# Patient Record
Sex: FEMALE | Race: WHITE | NOT HISPANIC OR LATINO | URBAN - METROPOLITAN AREA
[De-identification: names, ages, dates, MRNs, and addresses within clinical notes are randomized per-mention and may not be internally consistent; named-entity substitution may affect disease eponyms.]

---

## 2022-09-18 ENCOUNTER — HOSPITAL ENCOUNTER (EMERGENCY)
Facility: HOSPITAL | Age: 57
Discharge: HOME | End: 2022-09-18
Attending: GENERAL PRACTICE
Payer: MEDICARE

## 2022-09-18 VITALS
HEART RATE: 44 BPM | TEMPERATURE: 98 F | BODY MASS INDEX: 21.03 KG/M2 | DIASTOLIC BLOOD PRESSURE: 73 MMHG | HEIGHT: 67 IN | OXYGEN SATURATION: 98 % | RESPIRATION RATE: 15 BRPM | SYSTOLIC BLOOD PRESSURE: 124 MMHG | WEIGHT: 134 LBS

## 2022-09-18 DIAGNOSIS — S46.911A SHOULDER STRAIN, RIGHT, INITIAL ENCOUNTER: ICD-10-CM

## 2022-09-18 DIAGNOSIS — R00.1 SYMPTOMATIC SINUS BRADYCARDIA: ICD-10-CM

## 2022-09-18 DIAGNOSIS — R55 SYNCOPE AND COLLAPSE: ICD-10-CM

## 2022-09-18 DIAGNOSIS — R55 SYNCOPE, UNSPECIFIED SYNCOPE TYPE: ICD-10-CM

## 2022-09-18 DIAGNOSIS — T42.4X5A: Primary | ICD-10-CM

## 2022-09-18 LAB
ALBUMIN SERPL-MCNC: 4.2 GM/DL (ref 3.5–5)
ALBUMIN/GLOB SERPL: 1.4 RATIO (ref 1.1–2)
ALP SERPL-CCNC: 51 UNIT/L (ref 40–150)
ALT SERPL-CCNC: 22 UNIT/L (ref 0–55)
AMPHET UR QL SCN: NEGATIVE
AST SERPL-CCNC: 24 UNIT/L (ref 5–34)
B-HCG SERPL QL: NEGATIVE
BARBITURATE SCN PRESENT UR: NEGATIVE
BASOPHILS # BLD AUTO: 0.05 X10(3)/MCL (ref 0–0.2)
BASOPHILS NFR BLD AUTO: 0.9 %
BENZODIAZ UR QL SCN: POSITIVE
BILIRUBIN DIRECT+TOT PNL SERPL-MCNC: 0.5 MG/DL
BNP BLD-MCNC: 120.1 PG/ML
BUN SERPL-MCNC: 9 MG/DL (ref 9.8–20.1)
CALCIUM SERPL-MCNC: 9.9 MG/DL (ref 8.4–10.2)
CANNABINOIDS UR QL SCN: NEGATIVE
CHLORIDE SERPL-SCNC: 109 MMOL/L (ref 98–107)
CK MB SERPL-MCNC: 2.2 NG/ML
CK SERPL-CCNC: 98 U/L (ref 29–168)
CO2 SERPL-SCNC: 27 MMOL/L (ref 22–29)
COCAINE UR QL SCN: NEGATIVE
CREAT SERPL-MCNC: 0.65 MG/DL (ref 0.55–1.02)
EOSINOPHIL # BLD AUTO: 0.18 X10(3)/MCL (ref 0–0.9)
EOSINOPHIL NFR BLD AUTO: 3.3 %
ERYTHROCYTE [DISTWIDTH] IN BLOOD BY AUTOMATED COUNT: 13.7 % (ref 11.5–17)
FENTANYL UR QL SCN: NEGATIVE
GFR SERPLBLD CREATININE-BSD FMLA CKD-EPI: >60 MLS/MIN/1.73/M2
GLOBULIN SER-MCNC: 2.9 GM/DL (ref 2.4–3.5)
GLUCOSE SERPL-MCNC: 87 MG/DL (ref 74–100)
HCT VFR BLD AUTO: 41.4 % (ref 37–47)
HGB BLD-MCNC: 13.5 GM/DL (ref 12–16)
IMM GRANULOCYTES # BLD AUTO: 0.01 X10(3)/MCL (ref 0–0.04)
IMM GRANULOCYTES NFR BLD AUTO: 0.2 %
LYMPHOCYTES # BLD AUTO: 1.71 X10(3)/MCL (ref 0.6–4.6)
LYMPHOCYTES NFR BLD AUTO: 31.6 %
MCH RBC QN AUTO: 31.1 PG (ref 27–31)
MCHC RBC AUTO-ENTMCNC: 32.6 MG/DL (ref 33–36)
MCV RBC AUTO: 95.4 FL (ref 80–94)
MDMA UR QL SCN: NEGATIVE
MONOCYTES # BLD AUTO: 0.46 X10(3)/MCL (ref 0.1–1.3)
MONOCYTES NFR BLD AUTO: 8.5 %
NEUTROPHILS # BLD AUTO: 3 X10(3)/MCL (ref 2.1–9.2)
NEUTROPHILS NFR BLD AUTO: 55.5 %
NRBC BLD AUTO-RTO: 0 %
OPIATES UR QL SCN: NEGATIVE
PCP UR QL: NEGATIVE
PH UR: 7 [PH] (ref 3–11)
PLATELET # BLD AUTO: 222 X10(3)/MCL (ref 130–400)
PMV BLD AUTO: 9.8 FL (ref 7.4–10.4)
POTASSIUM SERPL-SCNC: 4.1 MMOL/L (ref 3.5–5.1)
PROT SERPL-MCNC: 7.1 GM/DL (ref 6.4–8.3)
RBC # BLD AUTO: 4.34 X10(6)/MCL (ref 4.2–5.4)
SODIUM SERPL-SCNC: 144 MMOL/L (ref 136–145)
SPECIFIC GRAVITY, URINE AUTO (.000) (OHS): 1.01 (ref 1–1.03)
TROPONIN I SERPL-MCNC: <0.01 NG/ML (ref 0–0.04)
TSH SERPL-ACNC: 1.1 UIU/ML (ref 0.35–4.94)
WBC # SPEC AUTO: 5.4 X10(3)/MCL (ref 4.5–11.5)

## 2022-09-18 PROCEDURE — 80053 COMPREHEN METABOLIC PANEL: CPT | Performed by: GENERAL PRACTICE

## 2022-09-18 PROCEDURE — 96372 THER/PROPH/DIAG INJ SC/IM: CPT | Mod: 59 | Performed by: GENERAL PRACTICE

## 2022-09-18 PROCEDURE — 81025 URINE PREGNANCY TEST: CPT | Performed by: GENERAL PRACTICE

## 2022-09-18 PROCEDURE — 99285 EMERGENCY DEPT VISIT HI MDM: CPT | Mod: 25

## 2022-09-18 PROCEDURE — 84484 ASSAY OF TROPONIN QUANT: CPT | Performed by: GENERAL PRACTICE

## 2022-09-18 PROCEDURE — 63600175 PHARM REV CODE 636 W HCPCS: Performed by: GENERAL PRACTICE

## 2022-09-18 PROCEDURE — 82553 CREATINE MB FRACTION: CPT | Performed by: GENERAL PRACTICE

## 2022-09-18 PROCEDURE — 36415 COLL VENOUS BLD VENIPUNCTURE: CPT | Performed by: GENERAL PRACTICE

## 2022-09-18 PROCEDURE — 85025 COMPLETE CBC W/AUTO DIFF WBC: CPT | Performed by: GENERAL PRACTICE

## 2022-09-18 PROCEDURE — 83880 ASSAY OF NATRIURETIC PEPTIDE: CPT | Performed by: GENERAL PRACTICE

## 2022-09-18 PROCEDURE — 80307 DRUG TEST PRSMV CHEM ANLYZR: CPT | Performed by: GENERAL PRACTICE

## 2022-09-18 PROCEDURE — 96375 TX/PRO/DX INJ NEW DRUG ADDON: CPT

## 2022-09-18 PROCEDURE — 82550 ASSAY OF CK (CPK): CPT | Performed by: GENERAL PRACTICE

## 2022-09-18 PROCEDURE — 84443 ASSAY THYROID STIM HORMONE: CPT | Performed by: GENERAL PRACTICE

## 2022-09-18 PROCEDURE — 25000003 PHARM REV CODE 250: Performed by: GENERAL PRACTICE

## 2022-09-18 PROCEDURE — 96374 THER/PROPH/DIAG INJ IV PUSH: CPT

## 2022-09-18 RX ORDER — KETOROLAC TROMETHAMINE 30 MG/ML
15 INJECTION, SOLUTION INTRAMUSCULAR; INTRAVENOUS
Status: COMPLETED | OUTPATIENT
Start: 2022-09-18 | End: 2022-09-18

## 2022-09-18 RX ORDER — DIAZEPAM 5 MG/1
5 TABLET ORAL
COMMUNITY
Start: 2022-08-24

## 2022-09-18 RX ORDER — FLUMAZENIL 0.1 MG/ML
0.5 INJECTION INTRAVENOUS ONCE
Status: COMPLETED | OUTPATIENT
Start: 2022-09-18 | End: 2022-09-18

## 2022-09-18 RX ORDER — SCOLOPAMINE TRANSDERMAL SYSTEM 1 MG/1
1 PATCH, EXTENDED RELEASE TRANSDERMAL
COMMUNITY
Start: 2022-04-05 | End: 2022-09-29

## 2022-09-18 RX ORDER — HYDROXYZINE HYDROCHLORIDE 25 MG/ML
50 INJECTION, SOLUTION INTRAMUSCULAR
Status: COMPLETED | OUTPATIENT
Start: 2022-09-18 | End: 2022-09-18

## 2022-09-18 RX ADMIN — FLUMAZENIL 0.5 MG: 0.1 INJECTION INTRAVENOUS at 12:09

## 2022-09-18 RX ADMIN — HYDROXYZINE HYDROCHLORIDE 50 MG: 25 INJECTION, SOLUTION INTRAMUSCULAR at 12:09

## 2022-09-18 RX ADMIN — KETOROLAC TROMETHAMINE 15 MG: 30 INJECTION, SOLUTION INTRAMUSCULAR; INTRAVENOUS at 12:09

## 2022-09-18 NOTE — ED PROVIDER NOTES
Encounter Date: 9/18/2022       History     Chief Complaint   Patient presents with    Loss of Consciousness     History of meniere disease, got dizzy and passed out at home. Took her valium at 4:30am. C/o pain to right shoulder, neck and middle back and head.     History of meniere disease, got dizzy and passed out at home. Took her valium at 4:30am. C/o pain to right shoulder, neck and middle back and head.    The history is provided by the patient.   Loss of Consciousness  This is a new problem. The current episode started less than 1 hour ago. The problem occurs rarely. The problem has not changed since onset.Associated symptoms include headaches. The symptoms are aggravated by bending and exertion. Nothing relieves the symptoms. She has tried nothing for the symptoms.   Review of patient's allergies indicates:   Allergen Reactions    Codeine Anaphylaxis    Opioids - morphine analogues     Tetanus vaccines and toxoid Other (See Comments)     Patient unable to tell us reaction she had    Wheat containing prod      Past Medical History:   Diagnosis Date    Meniere's disease, unspecified ear      History reviewed. No pertinent surgical history.  History reviewed. No pertinent family history.  Social History     Tobacco Use    Smoking status: Never    Smokeless tobacco: Never   Substance Use Topics    Alcohol use: Never    Drug use: Never     Review of Systems   Constitutional: Negative.    HENT: Negative.     Eyes: Negative.    Respiratory: Negative.     Cardiovascular:  Positive for syncope.   Gastrointestinal: Negative.    Endocrine: Negative.    Genitourinary: Negative.    Musculoskeletal:  Positive for gait problem, joint swelling, neck pain and neck stiffness.   Skin: Negative.    Allergic/Immunologic: Negative.    Neurological:  Positive for dizziness, syncope, weakness, light-headedness and headaches.   Hematological: Negative.    Psychiatric/Behavioral: Negative.     All other systems reviewed and are  negative.    Physical Exam     Initial Vitals   BP Pulse Resp Temp SpO2   09/18/22 1129 09/18/22 1129 09/18/22 1129 09/18/22 1156 09/18/22 1129   (!) 140/75 (!) 45 20 97.6 °F (36.4 °C) 100 %      MAP       --                Physical Exam    Nursing note and vitals reviewed.  Constitutional: She appears well-developed and well-nourished.   HENT:   Head: Normocephalic and atraumatic.   Eyes: EOM are normal. Pupils are equal, round, and reactive to light.   Neck: Trachea normal and phonation normal. Neck supple.     Patient presents in a C-collar.   Normal range of motion.  Musculoskeletal:      Cervical back: Normal range of motion and neck supple.         ED Course   Procedures  Labs Reviewed   DRUG SCREEN, URINE (BEAKER) - Abnormal; Notable for the following components:       Result Value    Benzodiazepine, Urine Positive (*)     All other components within normal limits    Narrative:     Cut off concentrations:    Amphetamines - 1000 ng/ml  Barbiturates - 200 ng/ml  Benzodiazepine - 200 ng/ml  Cannabinoids (THC) - 50 ng/ml  Cocaine - 300 ng/ml  Fentanyl - 1.0 ng/ml  MDMA - 500 ng/ml  Opiates - 300 ng/ml   Phencyclidine (PCP) - 25 ng/ml    Specimen submitted for drug analysis and tested for pH and specific gravity in order to evaluate sample integrity. Suspect tampering if specific gravity is <1.003 and/or pH is not within the range of 4.5 - 8.0  False negatives may result form substances such as bleach added to urine.  False positives may result for the presence of a substance with similar chemical structure to the drug or its metabolite.    This test provides only a PRELIMINARY analytical test result. A more specific alternate chemical method must be used in order to obtain a confirmed analytical result. Gas chromatography/mass spectrometry (GC/MS) is the preferred confirmatory method. Other chemical confirmation methods are available. Clinical consideration and professional judgement should be applied to any drug  of abuse test result, particularly when preliminary positive results are used.    Positive results will be confirmed only at the physicians request. Unconfirmed screening results are to be used only for medical purposes (treatment).        COMPREHENSIVE METABOLIC PANEL - Abnormal; Notable for the following components:    Chloride 109 (*)     Blood Urea Nitrogen 9.0 (*)     All other components within normal limits   B-TYPE NATRIURETIC PEPTIDE - Abnormal; Notable for the following components:    Natriuretic Peptide 120.1 (*)     All other components within normal limits   CBC WITH DIFFERENTIAL - Abnormal; Notable for the following components:    MCV 95.4 (*)     MCH 31.1 (*)     MCHC 32.6 (*)     All other components within normal limits   TROPONIN I - Normal   CK - Normal   CK-MB - Normal   TSH - Normal   HCG QUALITATIVE URINE - Normal   CBC W/ AUTO DIFFERENTIAL    Narrative:     The following orders were created for panel order CBC auto differential.  Procedure                               Abnormality         Status                     ---------                               -----------         ------                     CBC with Differential[[id number]]        Abnormal            Final result                 Please view results for these tests on the individual orders.     EKG Readings: (Independently Interpreted)   Initial Reading: No STEMI. Rhythm: Sinus Bradycardia. Heart Rate: 42. Ectopy: No Ectopy. Conduction: Normal. ST Segments: Normal ST Segments. T Waves: Normal. Clinical Impression: Sinus Bradycardia     Imaging Results              CT Head Without Contrast (Final result)  Result time 09/18/22 12:10:30      Final result by Saadia Feldman MD (09/18/22 12:10:30)                   Impression:      No acute intracranial abnormality.      Electronically signed by: Saadia Feldman  Date:    09/18/2022  Time:    12:10               Narrative:    EXAMINATION:  CT HEAD WITHOUT CONTRAST    CLINICAL  HISTORY:  Syncope, recurrent;    TECHNIQUE:  Axial scans were obtained from skull base to the vertex.    Coronal and sagittal reconstructions obtained from the axial data.    Automatic exposure control was utilized to limit radiation dose.    Contrast: None    Radiation Dose:    Total DLP: 1308 mGy*cm    COMPARISON:  None    FINDINGS:  There is no acute intracranial hemorrhage or edema. The gray-white matter differentiation is preserved.    There is no mass effect or midline shift. The ventricles and sulci are normal in size. The basal cisterns are patent. There is no abnormal extra-axial fluid collection.    The calvarium and skull base are intact.  Postoperative changes of prior right mastoidectomy.                                       CT Chest Without Contrast (Final result)  Result time 09/18/22 12:19:05      Final result by Saadia Feldman MD (09/18/22 12:19:05)                   Impression:      No acute abnormality of the chest.      Electronically signed by: Saadia Feldman  Date:    09/18/2022  Time:    12:19               Narrative:    EXAMINATION:  CT CHEST WITHOUT CONTRAST    CLINICAL HISTORY:  Cough, persistent;    TECHNIQUE:  CT imaging of the chest without IV contrast. Axial, coronal and sagittal reformatted images are reviewed. Dose length product is 1308 mGycm. Automatic exposure control, adjustment of mA/kV or iterative reconstruction technique was used to limit radiation dose.    COMPARISON:  None    FINDINGS:  Lungs and large airways: There are mild emphysematous changes of the lungs.  There is no focal airspace consolidation.    Pleura: No pleural effusion or pneumothorax.    Heart and vasculature: The heart is normal in size.  There is no pericardial effusion.    Mediastinum and eliza: The lack of IV contrast decreases sensitivity, but no pathologically enlarged lymph node is identified.    Chest wall/axilla and lower neck: No significant findings.    Upper abdomen: No significant  findings.    Bones: There is no acute fracture identified.                                       CT Cervical Spine Without Contrast (Final result)  Result time 09/18/22 12:14:41      Final result by Saadia Feldman MD (09/18/22 12:14:41)                   Impression:      No acute fracture identified.      Electronically signed by: Saadia Feldman  Date:    09/18/2022  Time:    12:14               Narrative:    EXAMINATION:  CT CERVICAL SPINE WITHOUT CONTRAST    CLINICAL HISTORY:  Neck pain, acute, no red flags;    TECHNIQUE:  Noncontrast CT images of the cervical spine. Axial, coronal, and sagittal reformatted images were obtained. Dose length product is 1308 mGycm. Automatic exposure control, adjustment of mA/kV or iterative reconstruction technique was used to limit radiation dose.    COMPARISON:  None    FINDINGS:  The cervical spine is visualized through the level of T1.    There is no acute fracture identified.  There is reversal of normal cervical lordosis.  There are mild degenerative changes with marginal osteophytes and facet arthropathy.  There is no paraspinal hematoma.                                       Medications   flumazeniL injection 0.5 mg (0.5 mg Intravenous Given 9/18/22 1235)   hydrOXYzine injection 50 mg (50 mg Intramuscular Given 9/18/22 1240)   ketorolac injection 15 mg (15 mg Intravenous Given 9/18/22 1257)     Medical Decision Making:   Clinical Tests:   Lab Tests: Ordered and Reviewed  Radiological Study: Ordered and Reviewed  Medical Tests: Ordered and Reviewed  ED Management:    Workup here in the ED is significant for symptomatic bradycardia likely due to benzodiazepine overuse.  CT scan of the head neck and chest including the right shoulder are normal.  Patient underwent some withdrawal after administration of Romazicon.  However we were able to improve her symptomatic bradycardia.  This indicates likely overuse or heavy reliance or long-term use of benzos.  Cardiac workup is  unremarkable and the patient is cleared from a trauma standpoint.    Long discussion with the patient's son regarding the use of benzodiazepines.  Patient is taking Valium regularly for her Meniere's disease.  I believe this is resulted in symptomatic bradycardia.  When she was administered Romazicon, the patient immediately went through withdrawals necessitating the need for Vistaril to calm her anxiety.  I am concerned that she may be taking too much Valium however this is something that she will need to discuss with her ENT doctor who is prescribing it.  I advised the son of my concerns.           ED Course as of 09/18/22 4075   Sun Sep 18, 2022   1215 Romazicon was given to the patient to reverse possible benzodiazepine overdose.  Shortly after getting the rim as a con which was not even the full dose patient became very agitated.  However her vital signs improved with heart rate increasing from 45 to slightly over 100.  It is possible that long-term benzodiazepine use of Valium has resulted in some sort of dependency with bradycardia now presenting on EKG.  This may be the cause of her syncopal episode. [PG]      ED Course User Index  [PG] Santy Hernandez MD                 Clinical Impression:   Final diagnoses:  [R55] Syncope and collapse  [S46.911A] Shoulder strain, right, initial encounter  [R55] Syncope, unspecified syncope type  [R00.1] Symptomatic sinus bradycardia  [T42.4X5A] Benzodiazepine causing adverse effect in therapeutic use, initial encounter (Primary)        ED Disposition Condition    Discharge Stable          ED Prescriptions    None       Follow-up Information       Follow up With Specialties Details Why Contact Info    Mick Torres III, MD Otolaryngology Call in 3 days If symptoms worsen [address] [facility] [id number]  [phone]               Santy Hernandez MD  09/18/22 6241

## 2022-09-29 ENCOUNTER — HOSPITAL ENCOUNTER (INPATIENT)
Facility: HOSPITAL | Age: 57
LOS: 1 days | Discharge: HOME | DRG: 262 | End: 2022-09-30
Attending: INTERNAL MEDICINE | Admitting: INTERNAL MEDICINE
Payer: MEDICARE

## 2022-09-29 ENCOUNTER — HOSPITAL ENCOUNTER (EMERGENCY)
Facility: HOSPITAL | Age: 57
Discharge: HOME | End: 2022-09-29
Attending: GENERAL PRACTICE
Payer: MEDICARE

## 2022-09-29 VITALS
BODY MASS INDEX: 21.03 KG/M2 | RESPIRATION RATE: 12 BRPM | OXYGEN SATURATION: 100 % | SYSTOLIC BLOOD PRESSURE: 125 MMHG | HEART RATE: 54 BPM | TEMPERATURE: 99 F | DIASTOLIC BLOOD PRESSURE: 83 MMHG | WEIGHT: 134 LBS | HEIGHT: 67 IN

## 2022-09-29 DIAGNOSIS — R00.1 BRADYCARDIA: ICD-10-CM

## 2022-09-29 DIAGNOSIS — R42 DIZZINESS, NONSPECIFIC: ICD-10-CM

## 2022-09-29 DIAGNOSIS — R00.1 BRADYCARDIA: Primary | ICD-10-CM

## 2022-09-29 DIAGNOSIS — R55 SYNCOPE AND COLLAPSE: Primary | ICD-10-CM

## 2022-09-29 LAB
ALBUMIN SERPL-MCNC: 4.2 GM/DL (ref 3.5–5)
ALBUMIN/GLOB SERPL: 1.4 RATIO (ref 1.1–2)
ALP SERPL-CCNC: 52 UNIT/L (ref 40–150)
ALT SERPL-CCNC: 21 UNIT/L (ref 0–55)
AMPHET UR QL SCN: NEGATIVE
APPEARANCE UR: CLEAR
AST SERPL-CCNC: 23 UNIT/L (ref 5–34)
B-HCG SERPL QL: NEGATIVE
BACTERIA #/AREA URNS AUTO: NORMAL /HPF
BARBITURATE SCN PRESENT UR: NEGATIVE
BASOPHILS # BLD AUTO: 0.04 X10(3)/MCL (ref 0–0.2)
BASOPHILS NFR BLD AUTO: 0.7 %
BENZODIAZ UR QL SCN: POSITIVE
BILIRUB UR QL STRIP.AUTO: NEGATIVE MG/DL
BILIRUBIN DIRECT+TOT PNL SERPL-MCNC: 0.6 MG/DL
BNP BLD-MCNC: 84.2 PG/ML
BUN SERPL-MCNC: 13 MG/DL (ref 9.8–20.1)
CALCIUM SERPL-MCNC: 9.8 MG/DL (ref 8.4–10.2)
CANNABINOIDS UR QL SCN: NEGATIVE
CHLORIDE SERPL-SCNC: 107 MMOL/L (ref 98–107)
CK MB SERPL-MCNC: 2.1 NG/ML
CK SERPL-CCNC: 112 U/L (ref 29–168)
CO2 SERPL-SCNC: 27 MMOL/L (ref 22–29)
COCAINE UR QL SCN: NEGATIVE
COLOR UR AUTO: YELLOW
CREAT SERPL-MCNC: 0.64 MG/DL (ref 0.55–1.02)
EOSINOPHIL # BLD AUTO: 0.24 X10(3)/MCL (ref 0–0.9)
EOSINOPHIL NFR BLD AUTO: 4.3 %
ERYTHROCYTE [DISTWIDTH] IN BLOOD BY AUTOMATED COUNT: 13.6 % (ref 11.5–17)
FENTANYL UR QL SCN: NEGATIVE
FLUAV AG UPPER RESP QL IA.RAPID: NOT DETECTED
FLUBV AG UPPER RESP QL IA.RAPID: NOT DETECTED
GFR SERPLBLD CREATININE-BSD FMLA CKD-EPI: >60 MLS/MIN/1.73/M2
GLOBULIN SER-MCNC: 2.9 GM/DL (ref 2.4–3.5)
GLUCOSE SERPL-MCNC: 77 MG/DL (ref 74–100)
GLUCOSE UR QL STRIP.AUTO: NEGATIVE MG/DL
HCT VFR BLD AUTO: 39.9 % (ref 37–47)
HGB BLD-MCNC: 13.1 GM/DL (ref 12–16)
IMM GRANULOCYTES # BLD AUTO: 0.01 X10(3)/MCL (ref 0–0.04)
IMM GRANULOCYTES NFR BLD AUTO: 0.2 %
KETONES UR QL STRIP.AUTO: NEGATIVE MG/DL
LEUKOCYTE ESTERASE UR QL STRIP.AUTO: NEGATIVE UNIT/L
LYMPHOCYTES # BLD AUTO: 1.64 X10(3)/MCL (ref 0.6–4.6)
LYMPHOCYTES NFR BLD AUTO: 29.7 %
MCH RBC QN AUTO: 31.3 PG (ref 27–31)
MCHC RBC AUTO-ENTMCNC: 32.8 MG/DL (ref 33–36)
MCV RBC AUTO: 95.2 FL (ref 80–94)
MDMA UR QL SCN: NEGATIVE
MONOCYTES # BLD AUTO: 0.55 X10(3)/MCL (ref 0.1–1.3)
MONOCYTES NFR BLD AUTO: 10 %
NEUTROPHILS # BLD AUTO: 3 X10(3)/MCL (ref 2.1–9.2)
NEUTROPHILS NFR BLD AUTO: 55.1 %
NITRITE UR QL STRIP.AUTO: NEGATIVE
NRBC BLD AUTO-RTO: 0 %
OPIATES UR QL SCN: NEGATIVE
PCP UR QL: NEGATIVE
PH UR STRIP.AUTO: 7 [PH]
PH UR: 7 [PH] (ref 3–11)
PLATELET # BLD AUTO: 219 X10(3)/MCL (ref 130–400)
PMV BLD AUTO: 10.3 FL (ref 7.4–10.4)
POTASSIUM SERPL-SCNC: 3.8 MMOL/L (ref 3.5–5.1)
PROT SERPL-MCNC: 7.1 GM/DL (ref 6.4–8.3)
PROT UR QL STRIP.AUTO: NEGATIVE MG/DL
RBC # BLD AUTO: 4.19 X10(6)/MCL (ref 4.2–5.4)
RBC #/AREA URNS AUTO: NORMAL /HPF
RBC UR QL AUTO: NEGATIVE UNIT/L
SARS-COV-2 RNA RESP QL NAA+PROBE: NOT DETECTED
SODIUM SERPL-SCNC: 143 MMOL/L (ref 136–145)
SP GR UR STRIP.AUTO: <=1.005
SPECIFIC GRAVITY, URINE AUTO (.000) (OHS): <=1.005 (ref 1–1.03)
SQUAMOUS #/AREA URNS AUTO: NORMAL /HPF
TROPONIN I SERPL-MCNC: <0.01 NG/ML (ref 0–0.04)
TSH SERPL-ACNC: 1.11 UIU/ML (ref 0.35–4.94)
UROBILINOGEN UR STRIP-ACNC: 0.2 MG/DL
WBC # SPEC AUTO: 5.5 X10(3)/MCL (ref 4.5–11.5)
WBC #/AREA URNS AUTO: NORMAL /HPF

## 2022-09-29 PROCEDURE — 87636 SARSCOV2 & INF A&B AMP PRB: CPT | Performed by: GENERAL PRACTICE

## 2022-09-29 PROCEDURE — 84484 ASSAY OF TROPONIN QUANT: CPT | Performed by: GENERAL PRACTICE

## 2022-09-29 PROCEDURE — 63600175 PHARM REV CODE 636 W HCPCS: Performed by: GENERAL PRACTICE

## 2022-09-29 PROCEDURE — 82553 CREATINE MB FRACTION: CPT | Performed by: GENERAL PRACTICE

## 2022-09-29 PROCEDURE — 81001 URINALYSIS AUTO W/SCOPE: CPT | Performed by: GENERAL PRACTICE

## 2022-09-29 PROCEDURE — 93005 ELECTROCARDIOGRAM TRACING: CPT

## 2022-09-29 PROCEDURE — 80307 DRUG TEST PRSMV CHEM ANLYZR: CPT | Performed by: GENERAL PRACTICE

## 2022-09-29 PROCEDURE — 81025 URINE PREGNANCY TEST: CPT | Performed by: GENERAL PRACTICE

## 2022-09-29 PROCEDURE — 85025 COMPLETE CBC W/AUTO DIFF WBC: CPT | Performed by: GENERAL PRACTICE

## 2022-09-29 PROCEDURE — 25000003 PHARM REV CODE 250: Performed by: INTERNAL MEDICINE

## 2022-09-29 PROCEDURE — 25000003 PHARM REV CODE 250: Performed by: GENERAL PRACTICE

## 2022-09-29 PROCEDURE — 84443 ASSAY THYROID STIM HORMONE: CPT | Performed by: GENERAL PRACTICE

## 2022-09-29 PROCEDURE — 99900031 HC PATIENT EDUCATION (STAT)

## 2022-09-29 PROCEDURE — 11000001 HC ACUTE MED/SURG PRIVATE ROOM

## 2022-09-29 PROCEDURE — 99285 EMERGENCY DEPT VISIT HI MDM: CPT | Mod: 25

## 2022-09-29 PROCEDURE — 96372 THER/PROPH/DIAG INJ SC/IM: CPT | Performed by: GENERAL PRACTICE

## 2022-09-29 PROCEDURE — 83880 ASSAY OF NATRIURETIC PEPTIDE: CPT | Performed by: GENERAL PRACTICE

## 2022-09-29 PROCEDURE — 82550 ASSAY OF CK (CPK): CPT | Performed by: GENERAL PRACTICE

## 2022-09-29 PROCEDURE — 21400001 HC TELEMETRY ROOM

## 2022-09-29 PROCEDURE — 36415 COLL VENOUS BLD VENIPUNCTURE: CPT | Performed by: GENERAL PRACTICE

## 2022-09-29 PROCEDURE — 80053 COMPREHEN METABOLIC PANEL: CPT | Performed by: GENERAL PRACTICE

## 2022-09-29 RX ORDER — ACETAZOLAMIDE 250 MG/1
500 TABLET ORAL
Status: COMPLETED | OUTPATIENT
Start: 2022-09-29 | End: 2022-09-29

## 2022-09-29 RX ORDER — HYDROXYZINE HYDROCHLORIDE 25 MG/ML
25 INJECTION, SOLUTION INTRAMUSCULAR
Status: COMPLETED | OUTPATIENT
Start: 2022-09-29 | End: 2022-09-29

## 2022-09-29 RX ORDER — DIAZEPAM 2 MG/1
2 TABLET ORAL 2 TIMES DAILY
Status: DISCONTINUED | OUTPATIENT
Start: 2022-09-29 | End: 2022-09-30 | Stop reason: HOSPADM

## 2022-09-29 RX ADMIN — DIAZEPAM 2 MG: 2 TABLET ORAL at 11:09

## 2022-09-29 RX ADMIN — HYDROXYZINE HYDROCHLORIDE 25 MG: 25 INJECTION, SOLUTION INTRAMUSCULAR at 11:09

## 2022-09-29 RX ADMIN — ACETAZOLAMIDE 500 MG: 250 TABLET ORAL at 11:09

## 2022-09-29 NOTE — ED PROVIDER NOTES
Encounter Date: 9/29/2022       History     Chief Complaint   Patient presents with    Dizziness    Bradycardia     Arrived via [facility], reports of bradycardia and dizziness at home.  Has menires disease and states is in the middle of an attack.       Bradycardia Arrived via [facility], reports of bradycardia and dizziness at home.  Has menires disease and states is in the middle of an attack.  States she is on diazepam        The history is provided by the patient.   Dizziness  This is a recurrent problem. The current episode started 3 to 5 hours ago. The problem has been rapidly worsening. Pertinent negatives include no chest pain. Nothing aggravates the symptoms. Nothing relieves the symptoms.   Review of patient's allergies indicates:   Allergen Reactions    Codeine Anaphylaxis    Opioids - morphine analogues     Tetanus vaccines and toxoid Other (See Comments)     Patient unable to tell us reaction she had    Wheat containing prod      Past Medical History:   Diagnosis Date    Cervical cancer     Meniere's disease, unspecified ear     Seizures     Uterine cancer      History reviewed. No pertinent surgical history.  History reviewed. No pertinent family history.  Social History     Tobacco Use    Smoking status: Every Day     Types: Vaping with nicotine    Smokeless tobacco: Never   Substance Use Topics    Alcohol use: Never    Drug use: Never     Review of Systems   Constitutional: Negative.    HENT: Negative.     Eyes: Negative.    Respiratory: Negative.     Cardiovascular:  Negative for chest pain.   Gastrointestinal: Negative.    Endocrine: Negative.    Genitourinary: Negative.    Musculoskeletal: Negative.    Skin: Negative.    Allergic/Immunologic: Negative.    Neurological:  Positive for dizziness and weakness.   Hematological: Negative.    Psychiatric/Behavioral: Negative.     All other systems reviewed and are negative.    Physical Exam     Initial Vitals   BP Pulse Resp Temp SpO2   09/29/22 1005 09/29/22 1005  09/29/22 1005 09/29/22 1009 09/29/22 1005   (!) 172/83 (!) 44 18 98.6 °F (37 °C) 100 %      MAP       --                Physical Exam    Nursing note and vitals reviewed.  Constitutional: She appears well-developed and well-nourished.   HENT:   Head: Normocephalic and atraumatic.   Eyes: EOM are normal. Pupils are equal, round, and reactive to light.   Neck: Neck supple.   Normal range of motion.  Cardiovascular:  Normal rate, regular rhythm and normal heart sounds.           Pulmonary/Chest: Breath sounds normal.   Abdominal: Abdomen is soft. Bowel sounds are normal.   Musculoskeletal:         General: Normal range of motion.      Cervical back: Normal range of motion and neck supple.     Neurological: She is alert and oriented to person, place, and time. She has normal strength. GCS score is 15. GCS eye subscore is 4. GCS verbal subscore is 5. GCS motor subscore is 6.   Skin: Skin is warm and dry.   Psychiatric: She has a normal mood and affect. Her behavior is normal. Judgment and thought content normal.       ED Course   Procedures  Labs Reviewed   DRUG SCREEN, URINE (BEAKER) - Abnormal; Notable for the following components:       Result Value    Benzodiazepine, Urine Positive (*)     All other components within normal limits    Narrative:     Cut off concentrations:    Amphetamines - 1000 ng/ml  Barbiturates - 200 ng/ml  Benzodiazepine - 200 ng/ml  Cannabinoids (THC) - 50 ng/ml  Cocaine - 300 ng/ml  Fentanyl - 1.0 ng/ml  MDMA - 500 ng/ml  Opiates - 300 ng/ml   Phencyclidine (PCP) - 25 ng/ml    Specimen submitted for drug analysis and tested for pH and specific gravity in order to evaluate sample integrity. Suspect tampering if specific gravity is <1.003 and/or pH is not within the range of 4.5 - 8.0  False negatives may result form substances such as bleach added to urine.  False positives may result for the presence of a substance with similar chemical structure to the drug or its metabolite.    This test  provides only a PRELIMINARY analytical test result. A more specific alternate chemical method must be used in order to obtain a confirmed analytical result. Gas chromatography/mass spectrometry (GC/MS) is the preferred confirmatory method. Other chemical confirmation methods are available. Clinical consideration and professional judgement should be applied to any drug of abuse test result, particularly when preliminary positive results are used.    Positive results will be confirmed only at the physicians request. Unconfirmed screening results are to be used only for medical purposes (treatment).        CBC WITH DIFFERENTIAL - Abnormal; Notable for the following components:    RBC 4.19 (*)     MCV 95.2 (*)     MCH 31.3 (*)     MCHC 32.8 (*)     All other components within normal limits   B-TYPE NATRIURETIC PEPTIDE - Normal   TROPONIN I - Normal   CK - Normal   CK-MB - Normal   TSH - Normal   HCG QUALITATIVE URINE - Normal   URINALYSIS, MICROSCOPIC - Normal   COVID/FLU A&B PCR - Normal   CBC W/ AUTO DIFFERENTIAL    Narrative:     The following orders were created for panel order CBC auto differential.  Procedure                               Abnormality         Status                     ---------                               -----------         ------                     CBC with Differential[[id number]]        Abnormal            Final result                 Please view results for these tests on the individual orders.   COMPREHENSIVE METABOLIC PANEL   URINALYSIS, REFLEX TO URINE CULTURE     EKG Readings: (Independently Interpreted)   Initial Reading: No STEMI. Rhythm: Sinus Bradycardia. Heart Rate: 41. Ectopy: No Ectopy. Conduction: Normal. Axis: Normal. Clinical Impression: Sinus Bradycardia   ECG Results              EKG 12-lead (In process)  Result time 09/29/22 18:59:36      In process by Interface, Lab In [facility] (09/29/22 18:59:36)                   Narrative:    Test Reason : R42,    Vent. Rate : 043 BPM      Atrial Rate : 043 BPM     P-R Int : 144 ms          QRS Dur : 090 ms      QT Int : 482 ms       P-R-T Axes : 076 068 072 degrees     QTc Int : 407 ms    Marked sinus bradycardia  Abnormal ECG  When compared with ECG of 18-SEP-2022 11:27,  No significant change was found    Referred By: [email or web address]   SELF           Confirmed By:                                   Imaging Results              X-Ray Chest PA And Lateral (Final result)  Result time 09/29/22 11:45:42      Final result by Saadia Feldman MD (09/29/22 11:45:42)                   Impression:      No acute abnormality of the chest.      Electronically signed by: Saadia Feldman  Date:    09/29/2022  Time:    11:45               Narrative:    EXAMINATION:  XR CHEST PA AND LATERAL    CLINICAL HISTORY:  Dizziness and giddiness    TECHNIQUE:  PA and lateral chest radiographs    COMPARISON:  None.    FINDINGS:  The heart is normal size.  The lungs are clear without focal consolidation.  There is no pleural effusion or pneumothorax.  There are mild degenerative changes of the thoracic spine.                                       CT Head Without Contrast (Final result)  Result time 09/29/22 11:27:56      Final result by Saadia Feldman MD (09/29/22 11:27:56)                   Impression:      No acute intracranial abnormality.      Electronically signed by: Saadia Feldman  Date:    09/29/2022  Time:    11:27               Narrative:    EXAMINATION:  CT HEAD WITHOUT CONTRAST    CLINICAL HISTORY:  Dizziness, persistent/recurrent, cardiac or vascular cause suspected;    TECHNIQUE:  Axial scans were obtained from skull base to the vertex.    Coronal and sagittal reconstructions obtained from the axial data.    Automatic exposure control was utilized to limit radiation dose.    Contrast: None    Radiation Dose:    Total DLP: 729 mGy*cm    COMPARISON:  CT head dated 09/18/2022    FINDINGS:  There is no acute intracranial hemorrhage or edema. The gray-white  matter differentiation is preserved.    There is no mass effect or midline shift. The ventricles and sulci are normal in size. The basal cisterns are patent. There is no abnormal extra-axial fluid collection.    The calvarium and skull base are intact.  Postoperative changes with prior right mastoidectomy.                                       Medications   hydrOXYzine injection 25 mg (25 mg Intramuscular Given 9/29/22 1136)   acetaZOLAMIDE tablet 500 mg (500 mg Oral Given 9/29/22 1136)     Medical Decision Making:   Initial Assessment:   Patient was here [place] days ago with the exact same issues.  At that time it was deemed that the patient was suffering from bradycardia due to her use of benzodiazepines for her Meniere's disease.  Patient presents again with the same symptoms.  We are currently doing a workup, and the patient is asking for Valium for her Meniere's, but I am hesitant to give it given her heart rate is very low.  Clinical Tests:   Lab Tests: Ordered and Reviewed  The following lab test(s) were unremarkable: CBC  Radiological Study: Ordered and Reviewed  Medical Tests: Ordered and Reviewed  ED Management:  Workup is normal.  Patient is still experiencing weakness and other symptoms from her bradycardia.  She states that she has an appointment with Dr. Bullock on October 5th.  She lives by herself and has already had syncopal episodes.  She is hemodynamically stable.  It should be noted that the patient did come here 11 days ago and it was thought that her continued use of benzodiazepine for her Meniere's disease was contributing to her bradycardia.  We actually reversed her benzodiazepine in the ER with good effect.  The patient is demanding to be evaluated for her bradycardia as she lives alone and has had syncopal episodes.  She states she cannot wait to see Dr. Bullock October 5th.  Cardiology was consulted and they will try and get the patient to [facility] for admission and evaluation and  to see if a pacemaker placement would be appropriate.  I am currently waiting to hear back from Dr. Saucedo, the cardiologist regarding possible placement.           ED Course as of 09/29/22 1946   Thu Sep 29, 2022   1312 Transfer center is requesting that we consult [facility] via tele cardiology prior to arranging any sort of transfer. [PG]   1331 Transfer center called us back and stated that our [facility] and the [facility] have both declined to accept the patient due to the nonemergent nature of her condition.  We will proceed with tele cardiology consult and see if we can get the patient to another facility. [PG]      ED Course User Index  [PG] Santy Hernandez MD                 Clinical Impression:   Final diagnoses:  [R42] Dizziness, nonspecific  [R00.1] Bradycardia (Primary)      ED Disposition Condition    Transfer to Another Facility Stable                Santy Hernandez MD  09/29/22 1516       Santy Hernandez MD  09/29/22 1946

## 2022-09-29 NOTE — H&P (VIEW-ONLY)
[facility] - Emergency Dept  Cardiology  Consult Note    Patient Name: Mindi Dent  MRN: [id number]  Admission Date: 9/29/2022  Hospital Length of Stay: 0 days  Code Status: No Order   Attending Provider: Santy Hernandez MD   Consulting Provider: Jarred Lundy NP  Primary Care Physician: No primary care provider on file.  Principal Problem:<principal problem not specified>    Patient information was obtained from patient, ER records, and primary team.     Consults  Subjective:     Chief Complaint:    Tele cardiology consult   Location:  [place] emergency room  Reason for consultation:  Bradycardia   Consulting provider:  Dr. Hernandez  Duration:  Greater than 30 minutes including review of medical records, provider and patient interview    HPI:   57-year-old female unknown to our services.  She has a history of Meniere's disease and is seeing ENT as outpatient.  She is currently undergoing physical therapy and is prescribed Valium.  She presents to the ER today after she started to have a many years attack.  However she also started to develop some diaphoresis felt her heart fluttering and had weakness.  Her Apple watch said her heart rate was in the upper 20s.  She continued to feel weak called EMS she was transported to the emergency room EKG was done which showed sinus bradycardia.  There are no acute changes.  And she had adequate blood pressure.  Lab work done was unremarkable including cardiac biomarkers.  While in the emergency room her heart rate dipped down to the upper 30s.    Of note on September 18th she had a syncopal episode was brought into the emergency room with profound bradycardia she was given some Romazicon with improvement in her heart rate.  Workup at that time was also negative.    Past Medical History:   Diagnosis Date    Cervical cancer     Meniere's disease, unspecified ear     Seizures     Uterine cancer        History reviewed. No pertinent surgical history.    Review of  patient's allergies indicates:   Allergen Reactions    Codeine Anaphylaxis    Opioids - morphine analogues     Tetanus vaccines and toxoid Other (See Comments)     Patient unable to tell us reaction she had    Wheat containing prod        No current facility-administered medications on file prior to encounter.     Current Outpatient Medications on File Prior to Encounter   Medication Sig    diazePAM (VALIUM) 5 MG tablet Take 5 mg by mouth.    [DISCONTINUED] scopolamine (TRANSDERM-SCOP) 1.3-1.5 mg (1 mg over 3 days) 1 patch Every 3 (three) days.     Family History    None       Tobacco Use    Smoking status: Every Day     Types: Vaping with nicotine    Smokeless tobacco: Never   Substance and Sexual Activity    Alcohol use: Never    Drug use: Never    Sexual activity: Not on file     Review of Systems   Constitutional: Positive for malaise/fatigue.   HENT: Negative.     Eyes:  Negative for blurred vision.   Cardiovascular:  Positive for near-syncope, palpitations and syncope. Negative for chest pain.   Respiratory:  Negative for shortness of breath.    Endocrine: Negative.  Negative for cold intolerance.   Hematologic/Lymphatic: Negative.    Skin:  Negative for unusual hair distribution.   Musculoskeletal:  Positive for joint pain.   Gastrointestinal:  Positive for constipation.   Genitourinary: Negative.    Neurological:  Positive for dizziness and weakness.   Psychiatric/Behavioral: Negative.     Objective:     Vital Signs (Most Recent):  Temp: 98.6 °F (37 °C) (09/29/22 1009)  Pulse: (!) 47 (09/29/22 1335)  Resp: (!) 21 (09/29/22 1335)  BP: (!) 146/76 (09/29/22 1335)  SpO2: 100 % (09/29/22 1335)   Vital Signs (24h Range):  Temp:  [98.6 °F (37 °C)] 98.6 °F (37 °C)  Pulse:  [41-47] 47  Resp:  [18-21] 21  SpO2:  [98 %-100 %] 100 %  BP: (146-172)/(76-97) 146/76     Weight: 60.8 kg (134 lb)  Body mass index is 20.99 kg/m².    SpO2: 100 %  O2 Device (Oxygen Therapy): room air    No intake or output data in the 24 hours  ending 09/29/22 1409    Lines/Drains/Airways       None                   Physical Exam  Constitutional:       Appearance: Normal appearance.   HENT:      Head: Normocephalic and atraumatic.      Nose: Nose normal.      Mouth/Throat:      Mouth: Mucous membranes are moist.   Eyes:      Conjunctiva/sclera: Conjunctivae normal.   Cardiovascular:      Rate and Rhythm: Regular rhythm. Bradycardia present.      Pulses: Normal pulses.      Heart sounds: Normal heart sounds.   Pulmonary:      Effort: Pulmonary effort is normal.      Breath sounds: Normal breath sounds.   Abdominal:      General: Abdomen is flat.      Palpations: Abdomen is soft.   Musculoskeletal:         General: Normal range of motion.      Cervical back: Normal range of motion.   Skin:     General: Skin is warm and dry.   Neurological:      General: No focal deficit present.      Mental Status: She is alert and oriented to person, place, and time.   Psychiatric:         Mood and Affect: Mood normal.       Significant Labs:   Recent Lab Results         09/29/22  1040        Phencyclidine Negative       Albumin/Globulin Ratio 1.4       Albumin 4.2       Alkaline Phosphatase 52       ALT 21       Amphetamine Screen, Ur Negative       Appearance, UA Clear       AST 23       Bacteria, UA None Seen       Barbiturate Screen, Ur Negative       Baso # 0.04       Basophil % 0.7       Benzodiazepine Screen, Urine Positive       BILIRUBIN TOTAL 0.6       Bilirubin, UA Negative       BNP 84.2       BUN 13.0       Calcium 9.8       Cannabinoids, Urine Negative       Chloride 107       CO2 27       Cocaine (Metab.) Negative       Color, UA Yellow              CPK MB 2.1       Creatinine 0.64       eGFR >60       Eos # 0.24       Eosinophil % 4.3       Fentanyl, Urine Negative       Globulin, Total 2.9       Glucose 77       Glucose, UA Negative       Hematocrit 39.9       Hemoglobin 13.1       Immature Grans (Abs) 0.01       Immature Granulocytes 0.2        Ketones, UA Negative       Leukocytes, UA Negative       Lymph # 1.64       LYMPH % 29.7       MCH 31.3       MCHC 32.8       MCV 95.2       MDMA, Urine Negative       Mono # 0.55       Mono % 10.0       MPV 10.3       Neut # 3.0       Neut % 55.1       NITRITE UA Negative       nRBC 0.0       Occult Blood UA Negative       Opiate Scrn, Ur Negative       pH, UA 7.0       pH, Urine 7.0       Platelets 219       Potassium 3.8       Preg Test, Ur Negative       PROTEIN TOTAL 7.1       Protein, UA Negative       RBC 4.19       RBC, UA None Seen       RDW 13.6       Sodium 143       Specific Gravity,UA <=1.005       Specific Gravity, Urine Auto <=1.005       Squam Epithel, UA Rare       Thyroid Stimulating Hormone 1.1075       Troponin I <0.010       Urobilinogen, UA 0.2       WBC, UA None Seen       WBC 5.5               Significant Imaging: X-Ray: CXR: X-Ray Chest 1 View (CXR): No results found for this visit on 09/29/22.  Assessment and Plan:     There are no hospital problems to display for this patient.      VTE Risk Mitigation (From admission, onward)      None          Impression:  Bradycardia  Presyncope and dizziness  History of Meniere's disease    Plan:  This is the 2nd episode of profound weakness in the last couple of weeks.  She had syncope on the 18th which could have been secondary to benzodiazepines.  Today's episode was again severe to her with diaphoresis and weakness.  She has documented heart rate in the 30s.  I do feel she would be best served with admission to telemetry for monitoring and cycling of cardiac enzymes.  Avoid hannah blocking agents     I discussed this with Dr. Yordan Bullock and he is willing to accept Mrs. Dent on his service ( she has scheduled appointment with him ).      Thank you for consultation should you have any questions or concerns please do not hesitate to call.    Thank you for your consult.     Jarred Lundy NP  Cardiology   [facility] - [facility]  [facility]

## 2022-09-29 NOTE — Clinical Note
The left chest was prepped. The site was prepped with ChloraPrep. The site was clipped. The patient was draped. The patient was positioned supine.

## 2022-09-29 NOTE — CONSULTS
[facility] - Emergency Dept  Cardiology  Consult Note    Patient Name: Mindi Dent  MRN: [id number]  Admission Date: 9/29/2022  Hospital Length of Stay: 0 days  Code Status: No Order   Attending Provider: Santy Hernandez MD   Consulting Provider: Jarred Lundy NP  Primary Care Physician: No primary care provider on file.  Principal Problem:<principal problem not specified>    Patient information was obtained from patient, ER records, and primary team.     Consults  Subjective:     Chief Complaint:    Tele cardiology consult   Location:  [place] emergency room  Reason for consultation:  Bradycardia   Consulting provider:  Dr. Hernandez  Duration:  Greater than 30 minutes including review of medical records, provider and patient interview    HPI:   57-year-old female unknown to our services.  She has a history of Meniere's disease and is seeing ENT as outpatient.  She is currently undergoing physical therapy and is prescribed Valium.  She presents to the ER today after she started to have a many years attack.  However she also started to develop some diaphoresis felt her heart fluttering and had weakness.  Her Apple watch said her heart rate was in the upper 20s.  She continued to feel weak called EMS she was transported to the emergency room EKG was done which showed sinus bradycardia.  There are no acute changes.  And she had adequate blood pressure.  Lab work done was unremarkable including cardiac biomarkers.  While in the emergency room her heart rate dipped down to the upper 30s.    Of note on September 18th she had a syncopal episode was brought into the emergency room with profound bradycardia she was given some Romazicon with improvement in her heart rate.  Workup at that time was also negative.    Past Medical History:   Diagnosis Date    Cervical cancer     Meniere's disease, unspecified ear     Seizures     Uterine cancer        History reviewed. No pertinent surgical history.    Review of  patient's allergies indicates:   Allergen Reactions    Codeine Anaphylaxis    Opioids - morphine analogues     Tetanus vaccines and toxoid Other (See Comments)     Patient unable to tell us reaction she had    Wheat containing prod        No current facility-administered medications on file prior to encounter.     Current Outpatient Medications on File Prior to Encounter   Medication Sig    diazePAM (VALIUM) 5 MG tablet Take 5 mg by mouth.    [DISCONTINUED] scopolamine (TRANSDERM-SCOP) 1.3-1.5 mg (1 mg over 3 days) 1 patch Every 3 (three) days.     Family History    None       Tobacco Use    Smoking status: Every Day     Types: Vaping with nicotine    Smokeless tobacco: Never   Substance and Sexual Activity    Alcohol use: Never    Drug use: Never    Sexual activity: Not on file     Review of Systems   Constitutional: Positive for malaise/fatigue.   HENT: Negative.     Eyes:  Negative for blurred vision.   Cardiovascular:  Positive for near-syncope, palpitations and syncope. Negative for chest pain.   Respiratory:  Negative for shortness of breath.    Endocrine: Negative.  Negative for cold intolerance.   Hematologic/Lymphatic: Negative.    Skin:  Negative for unusual hair distribution.   Musculoskeletal:  Positive for joint pain.   Gastrointestinal:  Positive for constipation.   Genitourinary: Negative.    Neurological:  Positive for dizziness and weakness.   Psychiatric/Behavioral: Negative.     Objective:     Vital Signs (Most Recent):  Temp: 98.6 °F (37 °C) (09/29/22 1009)  Pulse: (!) 47 (09/29/22 1335)  Resp: (!) 21 (09/29/22 1335)  BP: (!) 146/76 (09/29/22 1335)  SpO2: 100 % (09/29/22 1335)   Vital Signs (24h Range):  Temp:  [98.6 °F (37 °C)] 98.6 °F (37 °C)  Pulse:  [41-47] 47  Resp:  [18-21] 21  SpO2:  [98 %-100 %] 100 %  BP: (146-172)/(76-97) 146/76     Weight: 60.8 kg (134 lb)  Body mass index is 20.99 kg/m².    SpO2: 100 %  O2 Device (Oxygen Therapy): room air    No intake or output data in the 24 hours  ending 09/29/22 1409    Lines/Drains/Airways       None                   Physical Exam  Constitutional:       Appearance: Normal appearance.   HENT:      Head: Normocephalic and atraumatic.      Nose: Nose normal.      Mouth/Throat:      Mouth: Mucous membranes are moist.   Eyes:      Conjunctiva/sclera: Conjunctivae normal.   Cardiovascular:      Rate and Rhythm: Regular rhythm. Bradycardia present.      Pulses: Normal pulses.      Heart sounds: Normal heart sounds.   Pulmonary:      Effort: Pulmonary effort is normal.      Breath sounds: Normal breath sounds.   Abdominal:      General: Abdomen is flat.      Palpations: Abdomen is soft.   Musculoskeletal:         General: Normal range of motion.      Cervical back: Normal range of motion.   Skin:     General: Skin is warm and dry.   Neurological:      General: No focal deficit present.      Mental Status: She is alert and oriented to person, place, and time.   Psychiatric:         Mood and Affect: Mood normal.       Significant Labs:   Recent Lab Results         09/29/22  1040        Phencyclidine Negative       Albumin/Globulin Ratio 1.4       Albumin 4.2       Alkaline Phosphatase 52       ALT 21       Amphetamine Screen, Ur Negative       Appearance, UA Clear       AST 23       Bacteria, UA None Seen       Barbiturate Screen, Ur Negative       Baso # 0.04       Basophil % 0.7       Benzodiazepine Screen, Urine Positive       BILIRUBIN TOTAL 0.6       Bilirubin, UA Negative       BNP 84.2       BUN 13.0       Calcium 9.8       Cannabinoids, Urine Negative       Chloride 107       CO2 27       Cocaine (Metab.) Negative       Color, UA Yellow              CPK MB 2.1       Creatinine 0.64       eGFR >60       Eos # 0.24       Eosinophil % 4.3       Fentanyl, Urine Negative       Globulin, Total 2.9       Glucose 77       Glucose, UA Negative       Hematocrit 39.9       Hemoglobin 13.1       Immature Grans (Abs) 0.01       Immature Granulocytes 0.2        Ketones, UA Negative       Leukocytes, UA Negative       Lymph # 1.64       LYMPH % 29.7       MCH 31.3       MCHC 32.8       MCV 95.2       MDMA, Urine Negative       Mono # 0.55       Mono % 10.0       MPV 10.3       Neut # 3.0       Neut % 55.1       NITRITE UA Negative       nRBC 0.0       Occult Blood UA Negative       Opiate Scrn, Ur Negative       pH, UA 7.0       pH, Urine 7.0       Platelets 219       Potassium 3.8       Preg Test, Ur Negative       PROTEIN TOTAL 7.1       Protein, UA Negative       RBC 4.19       RBC, UA None Seen       RDW 13.6       Sodium 143       Specific Gravity,UA <=1.005       Specific Gravity, Urine Auto <=1.005       Squam Epithel, UA Rare       Thyroid Stimulating Hormone 1.1075       Troponin I <0.010       Urobilinogen, UA 0.2       WBC, UA None Seen       WBC 5.5               Significant Imaging: X-Ray: CXR: X-Ray Chest 1 View (CXR): No results found for this visit on 09/29/22.  Assessment and Plan:     There are no hospital problems to display for this patient.      VTE Risk Mitigation (From admission, onward)      None          Impression:  Bradycardia  Presyncope and dizziness  History of Meniere's disease    Plan:  This is the 2nd episode of profound weakness in the last couple of weeks.  She had syncope on the 18th which could have been secondary to benzodiazepines.  Today's episode was again severe to her with diaphoresis and weakness.  She has documented heart rate in the 30s.  I do feel she would be best served with admission to telemetry for monitoring and cycling of cardiac enzymes.  Avoid hannah blocking agents     I discussed this with Dr. Yordan Bullock and he is willing to accept Mrs. Dent on his service ( she has scheduled appointment with him ).      Thank you for consultation should you have any questions or concerns please do not hesitate to call.    Thank you for your consult.     Jarred Lundy NP  Cardiology   [facility] - [facility]  Dept

## 2022-09-29 NOTE — ED NOTES
Pt arrived via [facility], states she is dizzy and reports weakness.  States she called her ENT because she is in a full blown menires attack. ENT directed to come to ER.

## 2022-09-30 VITALS
OXYGEN SATURATION: 98 % | HEART RATE: 57 BPM | WEIGHT: 134.5 LBS | DIASTOLIC BLOOD PRESSURE: 68 MMHG | BODY MASS INDEX: 21.11 KG/M2 | SYSTOLIC BLOOD PRESSURE: 110 MMHG | RESPIRATION RATE: 17 BRPM | HEIGHT: 67 IN | TEMPERATURE: 98 F

## 2022-09-30 PROBLEM — Z95.818 STATUS POST PLACEMENT OF IMPLANTABLE LOOP RECORDER: Status: ACTIVE | Noted: 2022-09-30

## 2022-09-30 PROBLEM — R55 SYNCOPE AND COLLAPSE: Status: ACTIVE | Noted: 2022-09-30

## 2022-09-30 PROBLEM — R00.1 BRADYCARDIA: Status: ACTIVE | Noted: 2022-09-30

## 2022-09-30 LAB
ALBUMIN SERPL-MCNC: 3.9 GM/DL (ref 3.5–5)
ALBUMIN/GLOB SERPL: 1.6 RATIO (ref 1.1–2)
ALP SERPL-CCNC: 52 UNIT/L (ref 40–150)
ALT SERPL-CCNC: 17 UNIT/L (ref 0–55)
AST SERPL-CCNC: 17 UNIT/L (ref 5–34)
AV INDEX (PROSTH): 0.7
AV MEAN GRADIENT: 4 MMHG
AV PEAK GRADIENT: 8 MMHG
AV VALVE AREA: 2.2 CM2
AV VELOCITY RATIO: 0.7
BASOPHILS # BLD AUTO: 0.06 X10(3)/MCL (ref 0–0.2)
BASOPHILS NFR BLD AUTO: 0.9 %
BILIRUBIN DIRECT+TOT PNL SERPL-MCNC: 0.4 MG/DL
BSA FOR ECHO PROCEDURE: 1.7 M2
BUN SERPL-MCNC: 14.6 MG/DL (ref 9.8–20.1)
CALCIUM SERPL-MCNC: 9.4 MG/DL (ref 8.4–10.2)
CHLORIDE SERPL-SCNC: 109 MMOL/L (ref 98–107)
CO2 SERPL-SCNC: 23 MMOL/L (ref 22–29)
CREAT SERPL-MCNC: 0.68 MG/DL (ref 0.55–1.02)
DOP CALC AO PEAK VEL: 1.39 M/S
DOP CALC AO VTI: 32.9 CM
DOP CALC LVOT AREA: 3.1 CM2
DOP CALC LVOT DIAMETER: 2 CM
DOP CALC LVOT PEAK VEL: 0.97 M/S
DOP CALC LVOT STROKE VOLUME: 72.53 CM3
DOP CALC MV VTI: 37.8 CM
DOP CALCLVOT PEAK VEL VTI: 23.1 CM
E/A RATIO: 1.18
E/E' RATIO: 7.09 M/S
EJECTION FRACTION: 60 %
EOSINOPHIL # BLD AUTO: 0.39 X10(3)/MCL (ref 0–0.9)
EOSINOPHIL NFR BLD AUTO: 6 %
ERYTHROCYTE [DISTWIDTH] IN BLOOD BY AUTOMATED COUNT: 13.8 % (ref 11.5–17)
GFR SERPLBLD CREATININE-BSD FMLA CKD-EPI: >60 MLS/MIN/1.73/M2
GLOBULIN SER-MCNC: 2.4 GM/DL (ref 2.4–3.5)
GLUCOSE SERPL-MCNC: 84 MG/DL (ref 74–100)
HCT VFR BLD AUTO: 38.8 % (ref 37–47)
HGB BLD-MCNC: 12.4 GM/DL (ref 12–16)
IMM GRANULOCYTES # BLD AUTO: 0.02 X10(3)/MCL (ref 0–0.04)
IMM GRANULOCYTES NFR BLD AUTO: 0.3 %
LEFT ATRIUM VOLUME INDEX MOD: 35.1 ML/M2
LEFT ATRIUM VOLUME MOD: 60 CM3
LEFT VENTRICLE DIASTOLIC VOLUME INDEX: 55.5 ML/M2
LEFT VENTRICLE DIASTOLIC VOLUME: 94.9 ML
LEFT VENTRICLE SYSTOLIC VOLUME INDEX: 19.8 ML/M2
LEFT VENTRICLE SYSTOLIC VOLUME: 33.8 ML
LV LATERAL E/E' RATIO: 6 M/S
LV SEPTAL E/E' RATIO: 8.67 M/S
LVOT MG: 2 MMHG
LVOT MV: 0.63 CM/S
LYMPHOCYTES # BLD AUTO: 2 X10(3)/MCL (ref 0.6–4.6)
LYMPHOCYTES NFR BLD AUTO: 31 %
MAGNESIUM SERPL-MCNC: 2.2 MG/DL (ref 1.6–2.6)
MCH RBC QN AUTO: 31.6 PG (ref 27–31)
MCHC RBC AUTO-ENTMCNC: 32 MG/DL (ref 33–36)
MCV RBC AUTO: 98.7 FL (ref 80–94)
MONOCYTES # BLD AUTO: 0.8 X10(3)/MCL (ref 0.1–1.3)
MONOCYTES NFR BLD AUTO: 12.4 %
MV MEAN GRADIENT: 1 MMHG
MV PEAK A VEL: 0.66 M/S
MV PEAK E VEL: 0.78 M/S
MV PEAK GRADIENT: 4 MMHG
MV STENOSIS PRESSURE HALF TIME: 91 MS
MV VALVE AREA BY CONTINUITY EQUATION: 1.92 CM2
MV VALVE AREA P 1/2 METHOD: 2.42 CM2
NEUTROPHILS # BLD AUTO: 3.2 X10(3)/MCL (ref 2.1–9.2)
NEUTROPHILS NFR BLD AUTO: 49.4 %
NRBC BLD AUTO-RTO: 0 %
PISA TR MAX VEL: 2.12 M/S
PLATELET # BLD AUTO: 210 X10(3)/MCL (ref 130–400)
PMV BLD AUTO: 9.9 FL (ref 7.4–10.4)
POTASSIUM SERPL-SCNC: 4 MMOL/L (ref 3.5–5.1)
PROT SERPL-MCNC: 6.3 GM/DL (ref 6.4–8.3)
RA PRESSURE: 3 MMHG
RBC # BLD AUTO: 3.93 X10(6)/MCL (ref 4.2–5.4)
SINUS: 3.2 CM
SODIUM SERPL-SCNC: 140 MMOL/L (ref 136–145)
TDI LATERAL: 0.13 M/S
TDI SEPTAL: 0.09 M/S
TDI: 0.11 M/S
TR MAX PG: 18 MMHG
TRICUSPID ANNULAR PLANE SYSTOLIC EXCURSION: 2.13 CM
TV REST PULMONARY ARTERY PRESSURE: 21 MMHG
WBC # SPEC AUTO: 6.5 X10(3)/MCL (ref 4.5–11.5)

## 2022-09-30 PROCEDURE — 25000003 PHARM REV CODE 250: Performed by: INTERNAL MEDICINE

## 2022-09-30 PROCEDURE — 80053 COMPREHEN METABOLIC PANEL: CPT | Performed by: INTERNAL MEDICINE

## 2022-09-30 PROCEDURE — 36415 COLL VENOUS BLD VENIPUNCTURE: CPT | Performed by: INTERNAL MEDICINE

## 2022-09-30 PROCEDURE — C1764 EVENT RECORDER, CARDIAC: HCPCS | Performed by: INTERNAL MEDICINE

## 2022-09-30 PROCEDURE — 93010 EKG 12-LEAD: ICD-10-PCS | Mod: ,,, | Performed by: INTERNAL MEDICINE

## 2022-09-30 PROCEDURE — 93010 ELECTROCARDIOGRAM REPORT: CPT | Mod: ,,, | Performed by: INTERNAL MEDICINE

## 2022-09-30 PROCEDURE — 83735 ASSAY OF MAGNESIUM: CPT | Performed by: INTERNAL MEDICINE

## 2022-09-30 PROCEDURE — 93005 ELECTROCARDIOGRAM TRACING: CPT

## 2022-09-30 PROCEDURE — 33285 INSJ SUBQ CAR RHYTHM MNTR: CPT

## 2022-09-30 PROCEDURE — 85025 COMPLETE CBC W/AUTO DIFF WBC: CPT | Performed by: INTERNAL MEDICINE

## 2022-09-30 DEVICE — MON LNQ11 REVEAL LINQ USA
Type: IMPLANTABLE DEVICE | Site: CHEST  WALL | Status: FUNCTIONAL
Brand: REVEAL LINQ™

## 2022-09-30 RX ORDER — LIDOCAINE HYDROCHLORIDE 20 MG/ML
INJECTION, SOLUTION INFILTRATION; PERINEURAL
Status: DISCONTINUED | OUTPATIENT
Start: 2022-09-30 | End: 2022-09-30 | Stop reason: HOSPADM

## 2022-09-30 RX ORDER — ACETAMINOPHEN 325 MG/1
650 TABLET ORAL EVERY 6 HOURS PRN
Status: DISCONTINUED | OUTPATIENT
Start: 2022-09-30 | End: 2022-09-30 | Stop reason: HOSPADM

## 2022-09-30 RX ORDER — IBUPROFEN 600 MG/1
600 TABLET ORAL EVERY 6 HOURS PRN
Qty: 20 TABLET | Refills: 1 | Status: SHIPPED | OUTPATIENT
Start: 2022-09-30

## 2022-09-30 RX ORDER — CIPROFLOXACIN 250 MG/1
250 TABLET, FILM COATED ORAL EVERY 12 HOURS
Qty: 14 TABLET | Refills: 1 | Status: SHIPPED | OUTPATIENT
Start: 2022-09-30

## 2022-09-30 RX ADMIN — DIAZEPAM 2 MG: 2 TABLET ORAL at 08:09

## 2022-09-30 NOTE — PLAN OF CARE
Pt may be d/c'ed later after procedure.  Spoke to pt and she states she has her RW,  W/C and cane at home for use.  She has been contacted by [facility] for PCA services and they may begin Monday.  I asked pt since her fmly would be working and unavailable to assist over week-end did she feel she was safe for d/c.  Pt stated she can safely get around in her w/c at home and she has neighbors that she can ask to sit with her for periods of time over the week-end and provide her with meals as well.  She states she will be able to work out if [place] cannot provide sitters Sat and Sun- she is awaiting a call back from [place] to discuss at this time.

## 2022-09-30 NOTE — H&P
[facility]                       [address] [place] [address]    PATIENT NAME:       REINIER DEXTER   YOB: 1965  CSN:                [id number]   MRN:                [id number]  ADMIT DATE:         09/29/2022 20:58:00  PHYSICIAN:          Yordan Bullock DO                        HISTORY AND PHYSICAL      REASON FOR ADMISSION:  History of syncope and palpitations.    HISTORY:  Mrs. Dexter is a 57-year-old white female with no significant past   cardiac history.  She has a history of having Meniere disease, for which she   takes Valium.  The patient states that for several weeks now she has been having   bouts of low heart rate noted on her smart watch.  Her heart rate has dropped   as low as in the 20s.  She also admitted to having a question of a syncopal   episode within the last 2 to 3 weeks.  She also admits to having episodes of   dizziness.  The patient presented to the hospital in [place] and then transferred   here to [facility] for evaluation and management.  At the   moment, she is resting comfortably in bed and offering no problems or   complaints.    PAST MEDICAL/PAST SURGICAL HISTORY:    1. As outlined above.    2. She also has a history of cervical cancer and uterine cancer, for which she   has undergone a hysterectomy.    3. A history of Meniere disease affecting the right ear, for which she has had   at least 9 surgeries.  4. Had a history of seizure disorder.  5. Other orthopedic surgeries.    ALLERGIES:    1. CODEINE.  2. OPIOIDS.  3. TETANUS.  4. WHEAT.       MEDICATIONS:  Chronic medications at home include Valium 5 mg twice daily,   mostly for the Meniere disease.    FAMILY HISTORY:  Noncontributory.    SOCIAL HISTORY:  Negative for alcohol, tobacco, or illicit drug abuse.    REVIEW OF SYSTEMS:  As per History of Present Illness.    PHYSICAL EXAMINATION:  GENERAL:   Shows a pleasant, cooperative, awake, alert, oriented 57-year-old   white female resting comfortably in bed.    VITAL SIGNS:  On admission, show a blood pressure of 172/83, and a heart rate of   44 beats per minute.  She is afebrile.  Oxygen saturation is 100% on room air.      HEENT:  Examination is grossly unremarkable.  NECK:  Supple, with soft bilateral carotid bruits, with no JVD noted.  CARDIAC:  Examination shows a regular rhythm, with a soft systolic ejection   murmur and slightly bradycardic at the moment.  CHEST:  Lungs fields clear to auscultation bilaterally anteriorly.    ABDOMEN:  Soft, but otherwise benign.    EXTREMITIES:  Show no significant clubbing, cyanosis, or edema.    DIAGNOSTIC DATABASE:  Her laboratory data on admission shows a stable CBC and   chemistries, potassium 4.0, magnesium 2.2.      Telemetry currently shows a sinus bradycardia, with a heart rate in the 60s.      Urinalysis is positive for benzodiazepine and otherwise unremarkable.      A CAT scan of the head on admission shows no acute intracranial abnormalities.      A chest x-ray shows no acute cardiopulmonary abnormalities.      Electrocardiogram shows a sinus bradycardia at 43 beats per minute.  There were   also some nonspecific ST-T wave abnormalities.      Her TSH is normal at 1.1075.    IMPRESSION:    1. History of syncope and history of presyncope.  2. History of bradycardia.  3. History of Meniere disease, for which she is on Valium on a regular basis.    RECOMMENDATIONS:  The patient is being admitted for further evaluation and   management with respect to her cardiac arrhythmia issue.  She will be admitted   to the telemetry unit.  We will monitor her laboratory data closely.  We will   consider further evaluation, including implantable loop recorder placement and   subsequent monitoring to determine whether a pacemaker is needed.  Further   recommendations forthcoming.        ______________________________  Yordan ARRIETA  DO RADHA Bullock/SARA  DD:  09/30/2022  Time:  12:59PM  DT:  09/30/2022  Time:  01:28PM  Job #:  [id number]      HISTORY AND PHYSICAL

## 2022-09-30 NOTE — DISCHARGE SUMMARY
[facility] 3rd Floor Medical Telemetry  Discharge Note  Short Stay    Procedure(s) (LRB):  INSERTION, IMPLANTABLE LOOP RECORDER (N/A)      OUTCOME: Patient tolerated treatment/procedure well without complication and is now ready for discharge.    DISPOSITION: Home or Self Care    FINAL DIAGNOSIS:  <principal problem not specified>    FOLLOWUP: In clinic    DISCHARGE INSTRUCTIONS:  No discharge procedures on file.      Clinical Reference Documents Added to Patient Instructions         Document    BRADYCARDIA (ENGLISH)    FAINTING, ADULT ED (ENGLISH)    IMPLANTABLE LOOP RECORDER (ENGLISH)            TIME SPENT ON DISCHARGE: 60 minutes.     S/P successful implantation of Medtronic ILR, under sterile surgical technique, at the bedside, in the L parasternal location.      See dictated report.

## 2022-09-30 NOTE — BRIEF OP NOTE
S/P successful implantation of Medtronic ILR, under sterile surgical technique, at the bedside, in the L parasternal location.      See dictated report.

## 2022-09-30 NOTE — DISCHARGE SUMMARY
[facility]                       [address] [place] [address]    PATIENT NAME:       REINIER DEXTER   YOB: 1965  CSN:                [id number]   MRN:                [id number]  ADMIT DATE:         09/29/2022 10:03:00  PHYSICIAN:          Yordan Bullock DO                          DISCHARGE SUMMARY    DATE OF DISCHARGE:  09/30/2022 20:11:00    HOSPITAL COURSE:  Ms. Dexter was admitted for evaluation and management of   recurrent bouts of palpitations, syncope and presyncope.  She remained   hemodynamically stable throughout her hospitalization, and to further evaluate   her symptoms, we elected to proceed with implantation of a Medtronic implantable   loop recorder, which she successfully tolerated early this evening.  At this   point, she is ready for discharge home.    DISCHARGE DIAGNOSES:    1. Syncope and collapse.  2. Presyncope.  3. Meniere disease.    4. Status post implantation of Medtronic LINQ implantable loop recorder.    RECOMMENDATIONS:  The patient will be arranged for discharge home today, and she   will continue on the same medications as on admission.  She will also be   discharged with empiric Cipro and ibuprofen p.r.n.  I will be checking on her   progress in my office in the very near future.  We will continue to monitor her   progress closely as we titrate her medical therapies as tolerated.  I have asked   her to call or return should she have any further problems or complaints.  We   have given wound care instructions on how to use her implantable loop recorder.    Further recommendations forthcoming.        ______________________________  Yordan Bullock DO    CAT/AQS  DD:  09/30/2022  Time:  06:25PM  DT:  09/30/2022  Time:  06:34PM  Job #:  [id number]      DISCHARGE SUMMARY

## 2022-10-01 NOTE — PROGRESS NOTES
Discharge instructions given to patient. New medications and side effects reviewed. Wound care precautions discussed. IV and telemetry discontinued. Linq recorder placed at bedside and Dr. Bullock and patient okay with DC. Pt going home independently. Pt has all equipment needed at home. Pt wheeled down via wheelchair and picked up via private vehicle.

## 2022-10-04 NOTE — CLINICAL REVIEW
57-year-old female admitted on 9/29/2022 and discharged on 9/30/2022.  She initially presented for recurrent palpitations, syncope/presyncope.  She was admitted essentially for the implantation of an implantable loop recorder.  There was no evidence of hemodynamic instability, hypoxia, hypercapnia,.  The patient did have bradycardia.  There was no evidence of any perioperative complications, active comorbidities.  The patient was appropriate for an observation level of care    MD [email or web address]  , Physician Advisor

## 2022-10-14 ENCOUNTER — HOSPITAL ENCOUNTER (OUTPATIENT)
Dept: RADIOLOGY | Facility: HOSPITAL | Age: 57
Discharge: HOME | End: 2022-10-14
Attending: INTERNAL MEDICINE
Payer: MEDICARE

## 2022-10-14 DIAGNOSIS — R53.82 CHRONIC FATIGUE AND MALAISE: ICD-10-CM

## 2022-10-14 DIAGNOSIS — R53.81 CHRONIC FATIGUE AND MALAISE: ICD-10-CM

## 2022-10-14 DIAGNOSIS — R42 DIZZINESS: ICD-10-CM

## 2022-10-14 PROCEDURE — 75571 CT HRT W/O DYE W/CA TEST: CPT | Mod: TC

## (undated) DEVICE — SUT CTD VICRYL PLUS 4/0

## (undated) DEVICE — SUT VICRYL 3-0 27 CT-1